# Patient Record
Sex: MALE | Race: WHITE | NOT HISPANIC OR LATINO | Employment: UNEMPLOYED | URBAN - METROPOLITAN AREA
[De-identification: names, ages, dates, MRNs, and addresses within clinical notes are randomized per-mention and may not be internally consistent; named-entity substitution may affect disease eponyms.]

---

## 2023-09-18 ENCOUNTER — OFFICE VISIT (OUTPATIENT)
Dept: FAMILY MEDICINE CLINIC | Facility: CLINIC | Age: 78
End: 2023-09-18
Payer: COMMERCIAL

## 2023-09-18 VITALS
SYSTOLIC BLOOD PRESSURE: 128 MMHG | HEIGHT: 68 IN | OXYGEN SATURATION: 93 % | HEART RATE: 72 BPM | BODY MASS INDEX: 28.04 KG/M2 | DIASTOLIC BLOOD PRESSURE: 80 MMHG | WEIGHT: 185 LBS

## 2023-09-18 DIAGNOSIS — I48.91 ATRIAL FIBRILLATION, UNSPECIFIED TYPE (HCC): ICD-10-CM

## 2023-09-18 DIAGNOSIS — R73.03 PREDIABETES: ICD-10-CM

## 2023-09-18 DIAGNOSIS — Z86.73 STATUS POST CVA: Primary | ICD-10-CM

## 2023-09-18 DIAGNOSIS — I63.432 CEREBROVASCULAR ACCIDENT (CVA) DUE TO EMBOLISM OF LEFT POSTERIOR CEREBRAL ARTERY (HCC): ICD-10-CM

## 2023-09-18 DIAGNOSIS — Z12.5 PROSTATE CANCER SCREENING: ICD-10-CM

## 2023-09-18 DIAGNOSIS — N40.0 PROSTATE ENLARGEMENT: ICD-10-CM

## 2023-09-18 DIAGNOSIS — Z00.00 HEALTHCARE MAINTENANCE: ICD-10-CM

## 2023-09-18 DIAGNOSIS — Z76.89 ENCOUNTER TO ESTABLISH CARE: ICD-10-CM

## 2023-09-18 PROCEDURE — 99205 OFFICE O/P NEW HI 60 MIN: CPT | Performed by: NURSE PRACTITIONER

## 2023-09-18 RX ORDER — AMLODIPINE BESYLATE 10 MG/1
10 TABLET ORAL EVERY MORNING
COMMUNITY
Start: 2023-08-04 | End: 2023-09-18

## 2023-09-18 RX ORDER — FAMOTIDINE 20 MG/1
TABLET, FILM COATED ORAL
COMMUNITY
Start: 2023-08-04 | End: 2023-09-18

## 2023-09-18 RX ORDER — LOSARTAN POTASSIUM 100 MG/1
TABLET ORAL
COMMUNITY
Start: 2023-08-04 | End: 2023-09-18

## 2023-09-18 RX ORDER — ATORVASTATIN CALCIUM 40 MG/1
TABLET, FILM COATED ORAL
COMMUNITY
Start: 2023-08-04

## 2023-09-18 RX ORDER — TAMSULOSIN HYDROCHLORIDE 0.4 MG/1
0.4 CAPSULE ORAL DAILY
COMMUNITY
Start: 2023-08-22 | End: 2023-09-21

## 2023-09-18 RX ORDER — FINASTERIDE 5 MG/1
5 TABLET, FILM COATED ORAL DAILY
COMMUNITY
Start: 2023-08-22 | End: 2023-09-18

## 2023-09-18 NOTE — ASSESSMENT & PLAN NOTE
Advised patient to establish with cardiology. Advised to continue Eliquis twice daily, atorvastatin daily and metoprolol twice daily.

## 2023-09-18 NOTE — ASSESSMENT & PLAN NOTE
Patient does have follow-up with neurology next week. Advised to continue current medications until that time. Advised to start neuro rehab.

## 2023-09-18 NOTE — ASSESSMENT & PLAN NOTE
Patient does have a follow-up this week with urology. It does appear that finasteride was making him very ill.   Advised to continue tamsulosin and hold off on finasteride until he sees the urologist.

## 2023-09-18 NOTE — PROGRESS NOTES
BMI Counseling: Body mass index is 28.13 kg/m². The BMI is above normal. Nutrition recommendations include encouraging healthy choices of fruits and vegetables and increasing intake of lean protein. Rationale for BMI follow-up plan is due to patient being overweight or obese. Depression Screening and Follow-up Plan: Patient was screened for depression during today's encounter. They screened negative with a PHQ-2 score of 2. Assessment/Plan:     Chronic Problems:  Atrial fibrillation [facility])  Advised patient to establish with cardiology. Advised to continue Eliquis twice daily, atorvastatin daily and metoprolol twice daily. Cerebrovascular accident (CVA) due to embolism of left posterior cerebral artery [facility])  Patient does have follow-up with neurology next week. Advised to continue current medications until that time. Advised to start neuro rehab. Prostate enlargement  Patient does have a follow-up this week with urology. It does appear that finasteride was making him very ill. Advised to continue tamsulosin and hold off on finasteride until he sees the urologist.     Prediabetes  Discussed with patient and daughter. Discussed low carbohydrate diet. Visit Diagnosis:  Diagnoses and all orders for this visit:    Status post CVA  -     Ambulatory Referral to Cardiology; Future  -     Ambulatory Referral to Physical Therapy; Future  -     Ambulatory Referral to Social Work Care Management Program; Future  -     EXTERNAL Referral to [facility]; Future    Atrial fibrillation, unspecified type [facility])  -     Ambulatory Referral to Cardiology; Future    Prostate cancer screening  -     PSA, Total Screen; Future    Healthcare maintenance  -     CBC and differential; Future  -     Comprehensive metabolic panel; Future  -     Lipid panel; Future  -     TSH, 3rd generation with Free T4 reflex;  Future    Encounter to establish care    Cerebrovascular accident (CVA) due to embolism of left posterior cerebral artery (HCC)    Prostate enlargement    Prediabetes    Other orders  -     Discontinue: amLODIPine (NORVASC) 10 mg tablet; Take 10 mg by mouth every morning  -     apixaban (ELIQUIS) 5 mg; Take 5 mg by mouth 2 (two) times a day  -     atorvastatin (LIPITOR) 40 mg tablet; TAKE 1 TABLET BY MOUTH NIGHTLY FOR 30 DAYS. -     Discontinue: famotidine (PEPCID) 20 mg tablet; TAKE 1 TABLET (20 MG TOTAL) BY MOUTH IN THE MORNING  -     Discontinue: finasteride (PROSCAR) 5 mg tablet; Take 5 mg by mouth daily  -     Discontinue: losartan (COZAAR) 100 MG tablet; TAKE 1 TABLET (100 MG TOTAL) BY MOUTH IN THE MORNING  -     metoprolol tartrate (LOPRESSOR) 25 mg tablet; TAKE 1 TABLET (25 MG TOTAL) BY MOUTH IN THE MORNING AND 1 TABLET (25 MG TOTAL) IN THE EVENING.  -     tamsulosin (FLOMAX) 0.4 mg; Take 0.4 mg by mouth daily          Subjective:    Patient ID: Javed Wing is a 78 y.o. male. Patient presents to establish care with his daughter. Patient has no significant medical history as he has not had medical care in quite some time. Stephen Baer suffered his first CVA on 7/15/23 and admitted for CVA d/t A Fib to [facility]. He went to rehab at [facility]. He then went back to [facility] and had a second CVA. He went to [facility] after the [facility] admission. Stephen Baer did have some issues with his urinary catheter as he pulled out in the hospital causing urethral damage. It does appear that patient was recently diagnosed with BPH as well. He does see the Urologist in 4 days. Patient was sent home on multiple medications. It does appear the medications were making him quite ill, so he is currently only taking Eliquis and metoprolol. His blood pressure at home has been very well controlled 110s to 120s over 80s. He is quite depressed as his wife passed away few years ago. He is very close with his daughter and is currently living with her as she is his caregiver.   His daughter does need help with setting up home health care, physical therapy and FMLA for herself. Patient is in a wheelchair today, but is able to get up without assistance. He does hold onto walls and has a walker at home to get around. He is able to dress himself, but unable to prepare meals for himself. The following portions of the patient's history were reviewed and updated as appropriate: allergies, current medications, past family history, past medical history, past social history, past surgical history and problem list.    Review of Systems   Constitutional: Positive for activity change, appetite change and fatigue. Negative for chills, diaphoresis and fever. HENT: Negative. Respiratory: Negative for cough, shortness of breath and wheezing. Cardiovascular: Negative for chest pain and palpitations. Gastrointestinal: Negative for diarrhea, nausea and vomiting. Genitourinary: Negative. Neurological: Negative for dizziness, tremors, speech difficulty, weakness, light-headedness and headaches. Psychiatric/Behavioral: Positive for dysphoric mood and sleep disturbance. The patient is not nervous/anxious. /80   Pulse 72   Ht 5' 8" (1.727 m)   Wt 83.9 kg (185 lb)   SpO2 93%   BMI 28.13 kg/m²   Social History     Socioeconomic History   • Marital status:      Spouse name: Not on file   • Number of children: Not on file   • Years of education: Not on file   • Highest education level: Not on file   Occupational History   • Not on file   Tobacco Use   • Smoking status: Former     Types: Cigarettes   • Smokeless tobacco: Never   Substance and Sexual Activity   • Alcohol use:  Yes     Alcohol/week: 2.0 standard drinks of alcohol     Types: 2 Glasses of wine per week   • Drug use: Never   • Sexual activity: Not Currently   Other Topics Concern   • Not on file   Social History Narrative   • Not on file     Social Determinants of Health     Financial Resource Strain: Not on file   Food Insecurity: Not on file   Transportation Needs: Not on file   Physical Activity: Not on file   Stress: Not on file   Social Connections: Not on file   Intimate Partner Violence: Not on file   Housing Stability: Not on file     Past Medical History:   Diagnosis Date   • Atrial fibrillation ([address])    • Hypertension    • Stroke [facility])      Family History   Problem Relation Age of Onset   • No Known Problems Mother    • Coronary artery disease Father      Past Surgical History:   Procedure Laterality Date   • CATARACT EXTRACTION         Current Outpatient Medications:   •  apixaban (ELIQUIS) 5 mg, Take 5 mg by mouth 2 (two) times a day, Disp: , Rfl:   •  atorvastatin (LIPITOR) 40 mg tablet, TAKE 1 TABLET BY MOUTH NIGHTLY FOR 30 DAYS., Disp: , Rfl:   •  metoprolol tartrate (LOPRESSOR) 25 mg tablet, TAKE 1 TABLET (25 MG TOTAL) BY MOUTH IN THE MORNING AND 1 TABLET (25 MG TOTAL) IN THE EVENING., Disp: , Rfl:   •  tamsulosin (FLOMAX) 0.4 mg, Take 0.4 mg by mouth daily, Disp: , Rfl:     No Known Allergies       Lab Review 6  No results found for any previous visit. Imaging: No results found. Objective:     Physical Exam  Constitutional:       General: He is not in acute distress. Appearance: He is well-developed. Cardiovascular:      Rate and Rhythm: Normal rate. Rhythm irregular. Heart sounds: Normal heart sounds. No murmur heard. No gallop. Pulmonary:      Effort: Pulmonary effort is normal. No respiratory distress. Breath sounds: Normal breath sounds. No wheezing. Musculoskeletal:         General: Normal range of motion. Skin:     General: Skin is warm and dry. Neurological:      Mental Status: He is alert and oriented to person, place, and time. Mental status is at baseline. Cranial Nerves: No cranial nerve deficit. Sensory: No sensory deficit. Motor: No weakness. Coordination: Coordination abnormal.      Gait: Gait abnormal.           There are no Patient Instructions on file for this visit.     Archie Door YAZMIN Basurto    Portions of the record may have been created with voice recognition software. Occasional wrong word or "sound a like" substitutions may have occurred due to the inherent limitations of voice recognition software. Read the chart carefully and recognize, using context, where substitutions have occurred. negative...

## 2023-09-21 ENCOUNTER — PATIENT OUTREACH (OUTPATIENT)
Dept: FAMILY MEDICINE CLINIC | Facility: CLINIC | Age: 78
End: 2023-09-21

## 2023-09-21 NOTE — PROGRESS NOTES
RANCHO MOSLEY received IB message from PCP requesting assistance in arranged [facility] for patient, to discuss with daughter-Cristina. RANCHO MOSLEY spoke with daughter whom confirmed patient recently readmitted to [facility] where he currently is being treated. RANCHO MOSLEY informed daughter that they will likely set up some [facility] for him, however RANCHO MOSLEY will f/u with her in a week to ensure that something is arranged. Daughter confirms that he is currently residing in [place] with her, and is unsure if he will be returning to [place] at all, as [facility] was discussing waiver services. RANCHO MOSLEY informed her that if the family does choose to move his residence to [place], it would be beneficial to apply for [place] Medicaid and [place] for additional help at home, as it appears he qualifies financially for Medicaid in [place]. Plan to f/u within 1 week and will further discuss once patient is discharged home from the hospital.  Daughter confirmed patient still has capacity and would like him involved in his care conversations once home from the hospital as well.

## 2023-09-28 ENCOUNTER — TELEPHONE (OUTPATIENT)
Dept: FAMILY MEDICINE CLINIC | Facility: CLINIC | Age: 78
End: 2023-09-28

## 2023-09-28 NOTE — TELEPHONE ENCOUNTER
Patient daughter came in and dropped off paperwork that needs to be filled out again. She stated that the company didn't accept and needs forms completed and faxed. [facility]  Thanks

## 2023-10-03 ENCOUNTER — PATIENT OUTREACH (OUTPATIENT)
Dept: FAMILY MEDICINE CLINIC | Facility: CLINIC | Age: 78
End: 2023-10-03

## 2023-10-03 NOTE — PROGRESS NOTES
[facility] SW reached out to daughter for f/u whom confirmed that patient is discharged and on his way home from the hospital now. Plan is [facility]. She has already contacted aging to try and get additional aides in the home. No further SW need at this time. Daughter was provided with contact information for any questions or concerns moving forward.

## 2023-10-20 ENCOUNTER — RA CDI HCC (OUTPATIENT)
Dept: OTHER | Facility: HOSPITAL | Age: 78
End: 2023-10-20

## 2023-10-20 NOTE — PROGRESS NOTES
[address] coding opportunities       Chart reviewed, no opportunity found: [address] Review     Patients Insurance     Medicare Insurance: [employer]

## 2023-10-30 ENCOUNTER — RA CDI HCC (OUTPATIENT)
Dept: OTHER | Facility: HOSPITAL | Age: 78
End: 2023-10-30

## 2023-10-30 NOTE — PROGRESS NOTES
[address] [facility] coding opportunities       Chart reviewed, no opportunity found: CHART REVIEWED, [address]     Patients Insurance     Medicare Insurance: [employer]